# Patient Record
Sex: FEMALE | URBAN - METROPOLITAN AREA
[De-identification: names, ages, dates, MRNs, and addresses within clinical notes are randomized per-mention and may not be internally consistent; named-entity substitution may affect disease eponyms.]

---

## 2020-01-08 ENCOUNTER — RECORDS - HEALTHEAST (OUTPATIENT)
Dept: LAB | Facility: CLINIC | Age: 50
End: 2020-01-08

## 2020-01-08 LAB
BNP SERPL-MCNC: 25 PG/ML (ref 0–73)
T3FREE SERPL-MCNC: 2.8 PG/ML (ref 1.9–3.9)

## 2020-01-09 LAB
ANA SER QL: 0.5 U
THYROID PEROXIDASE ANTIBODIES - HISTORICAL: <3 IU/ML (ref 0–5.6)

## 2020-01-17 LAB — MAMMOGRAM: NORMAL

## 2020-02-13 ENCOUNTER — TRANSFERRED RECORDS (OUTPATIENT)
Dept: HEALTH INFORMATION MANAGEMENT | Facility: CLINIC | Age: 50
End: 2020-02-13

## 2020-10-16 ENCOUNTER — TELEPHONE (OUTPATIENT)
Dept: OTOLARYNGOLOGY | Facility: CLINIC | Age: 50
End: 2020-10-16

## 2020-10-16 ENCOUNTER — TRANSFERRED RECORDS (OUTPATIENT)
Dept: HEALTH INFORMATION MANAGEMENT | Facility: CLINIC | Age: 50
End: 2020-10-16

## 2020-10-16 NOTE — TELEPHONE ENCOUNTER
[facility] Call Center    Phone Message    May a detailed message be left on voicemail: yes     Reason for Call: Other: Pt wants to see Dr. Javed regarding her parathyroid, referred by Endo - Dr Dennis from [facility], Pt is having records and referral faxed, please review for appt when everything is recieved, Thanks!     Action Taken: Message routed to:  [facility] ENT    Travel Screening: Not Applicable

## 2020-10-21 ENCOUNTER — TRANSFERRED RECORDS (OUTPATIENT)
Dept: HEALTH INFORMATION MANAGEMENT | Facility: CLINIC | Age: 50
End: 2020-10-21

## 2020-11-02 ENCOUNTER — TRANSCRIBE ORDERS (OUTPATIENT)
Dept: OTHER | Age: 50
End: 2020-11-02

## 2020-11-02 DIAGNOSIS — E21.3 HYPERPARATHYROIDISM (H): Primary | ICD-10-CM

## 2020-11-04 ENCOUNTER — DOCUMENTATION ONLY (OUTPATIENT)
Dept: CARE COORDINATION | Facility: CLINIC | Age: 50
End: 2020-11-04

## 2020-11-04 NOTE — TELEPHONE ENCOUNTER
The pt called back, has not heard from anyone about scheduling. The dx hyperparatyroidism is not on the GL dx list. Please review for an appt and contact the pt. Thanks.

## 2020-11-04 NOTE — TELEPHONE ENCOUNTER
FUTURE VISIT INFORMATION      FUTURE VISIT INFORMATION:    Date: 11/16/20    Time: 1 PM    Location: [facility] [place]  REFERRAL INFORMATION:    Referring provider:  Carly WELLS    Referring providers clinic:  [facility]    Reason for visit/diagnosis: Hyperparathyroidism    RECORDS REQUESTED FROM:       Clinic name Comments Records Status Imaging Status   [facility] 10/16/20, 9/23/20 - ENDO TV with RUSSELL Shearer Care Everywhere    [facility] 11/27/17 - ENDO OV with Dr. Iniguez Care Everywhere    [facility] - Imaging 12/7/17 - [facility] Parathyroid Care Everywhere 11/4 Req - In PACs                 * 11/4/20 12:29 PM Faxed req to [facility] for images to be pushed to [place] PACs - Frances  * 11/5/20 3:14 PM Images received from [facility] and attached to patient in PACs. - Frances

## 2020-11-16 ENCOUNTER — VIRTUAL VISIT (OUTPATIENT)
Dept: OTOLARYNGOLOGY | Facility: CLINIC | Age: 50
End: 2020-11-16
Attending: INTERNAL MEDICINE
Payer: COMMERCIAL

## 2020-11-16 ENCOUNTER — PREP FOR PROCEDURE (OUTPATIENT)
Dept: OTOLARYNGOLOGY | Facility: CLINIC | Age: 50
End: 2020-11-16

## 2020-11-16 ENCOUNTER — TELEPHONE (OUTPATIENT)
Dept: SURGERY | Facility: CLINIC | Age: 50
End: 2020-11-16

## 2020-11-16 ENCOUNTER — PRE VISIT (OUTPATIENT)
Dept: OTOLARYNGOLOGY | Facility: CLINIC | Age: 50
End: 2020-11-16

## 2020-11-16 DIAGNOSIS — E21.3 HYPERPARATHYROIDISM (H): Primary | ICD-10-CM

## 2020-11-16 PROCEDURE — 99202 OFFICE O/P NEW SF 15 MIN: CPT | Performed by: SURGERY

## 2020-11-16 RX ORDER — DEXAMETHASONE SODIUM PHOSPHATE 4 MG/ML
10 INJECTION, SOLUTION INTRA-ARTICULAR; INTRALESIONAL; INTRAMUSCULAR; INTRAVENOUS; SOFT TISSUE ONCE
Status: CANCELLED | OUTPATIENT
Start: 2020-11-16 | End: 2020-11-16

## 2020-11-16 NOTE — PATIENT INSTRUCTIONS
1. You were seen in the ENT Clinic today by Dr. Javed.  If you have any questions or concerns after your appointment, please call   -  ENT Clinic: [phone]      Ernestine Ozuna LPN  [facility] Otolaryngology  [phone]

## 2020-11-16 NOTE — TELEPHONE ENCOUNTER
[facility] Call Center    Phone Message    May a detailed message be left on voicemail: yes     Reason for Call: Other: Pt is scheduled today at 1 PM with Dr Javed. Called in to let us know that 9 days ago she tested negative for Covid but last week her boyfriend tested positive. She has no symptoms and has avoided direct contact with him.     Should she come in for appt? No answer at back line. Writer advised her to come in unless she hears otherwise.     Action Taken: Message routed to:  [facility] ENT    Travel Screening: Not Applicable

## 2020-11-16 NOTE — TELEPHONE ENCOUNTER
Per Ernestine Ozuna LPN, informed patient we would like to switch her visit to a video visit. Patient is in agreement with this plan. Sent 7fgame sign up via email.    Stephanie Pereyra, EMT

## 2020-11-16 NOTE — LETTER
11/16/2020       RE: Chirsta Grover  [address] [address]     Dear Colleague,    Thank you for referring your patient, Christa Grover, to the [facility] [place] at [facility]. Please see a copy of my visit note below.    SURGERY CLINIC CONSULTATION    REASON FOR CONSULTATION:  Christa Grover was referred by Dr. Dennis for evaluation and discussion of treatment options for hyperparathyroidism       HISTORY OF PRESENT ILLNESS:  Christa Grover is a 50 year old female who was noted to be hypercalcemic since 2008. She was aware of this and choose not to proceed with surgery. Over the next 12 years her Ca has remained elevated (2017: Ca 11.0, PTH and 24hr UCa >400). In Feb 2020 diagnosed with Breast Ca. Most Recent Labs Oct 2020: Ca 10.8, PTH 95, 24 hr UCa 573. Localizing scan Single phase Sestamibi from 2017 did not localize a parathyroid adenoma.    Regarding sx associated with PHPTH: She denies nephrolithiasis, bone density (from 2017) is WNL. She admits to some increased fatigue. Denies muscle aches, GERD or sleep pattern disturbances. No family h/o PHTPH      REVIEW OF SYSTEMS:  ROS EXAM:  10pt ROS pertinent for that noted in HPI  Patient Active Problem List   Diagnosis     Hyperparathyroidism (H)       No past surgical history on file.    No Known Allergies    Medications reviewed in EMR        No family history on file.       PHYSICAL EXAM:  There were no vitals taken for this visit.    Neck: Soft. No cervical lymphadenopathy. Thyroid is midline and WNL    ASSESSMENT:   1. Hyperparathyroidism (H)        PLAN:     First I would like to get a localizing scan-SPEC-CT. Followed by Neck exploration resection parathyroid adenoma. We discussed the procedure as well as risks including, but not limited to bleeding, infection, injury to RLN(s), permanent hypocalcemia or missed parathyroid adenoma.   At the conclusion of our  visit, she felt as if all of her questions and concerns were addressed.    Carly Javed MD

## 2020-11-16 NOTE — PROGRESS NOTES
SURGERY CLINIC CONSULTATION    REASON FOR CONSULTATION:  Christa Grover was referred by Dr. Dennis for evaluation and discussion of treatment options for hyperparathyroidism       HISTORY OF PRESENT ILLNESS:  Christa Grover is a 50 year old female who was noted to be hypercalcemic since 2008. She was aware of this and choose not to proceed with surgery. Over the next 12 years her Ca has remained elevated (2017: Ca 11.0, PTH and 24hr UCa >400). In Feb 2020 diagnosed with Breast Ca. Most Recent Labs Oct 2020: Ca 10.8, PTH 95, 24 hr UCa 573. Localizing scan Single phase Sestamibi from 2017 did not localize a parathyroid adenoma.    Regarding sx associated with PHPTH: She denies nephrolithiasis, bone density (from 2017) is WNL. She admits to some increased fatigue. Denies muscle aches, GERD or sleep pattern disturbances. No family h/o PHTPH      REVIEW OF SYSTEMS:  ROS EXAM:  10pt ROS pertinent for that noted in HPI  Patient Active Problem List   Diagnosis     Hyperparathyroidism (H)       No past surgical history on file.    No Known Allergies    Medications reviewed in EMR        No family history on file.       PHYSICAL EXAM:  There were no vitals taken for this visit.    Neck: Soft. No cervical lymphadenopathy. Thyroid is midline and WNL    ASSESSMENT:   1. Hyperparathyroidism (H)        PLAN:     First I would like to get a localizing scan-SPEC-CT. Followed by Neck exploration resection parathyroid adenoma. We discussed the procedure as well as risks including, but not limited to bleeding, infection, injury to RLN(s), permanent hypocalcemia or missed parathyroid adenoma.   At the conclusion of our visit, she felt as if all of her questions and concerns were addressed.    Carly Javed MD

## 2020-11-18 PROBLEM — E21.3 HYPERPARATHYROIDISM (H): Status: ACTIVE | Noted: 2020-11-18

## 2020-11-20 DIAGNOSIS — Z11.59 ENCOUNTER FOR SCREENING FOR OTHER VIRAL DISEASES: Primary | ICD-10-CM

## 2020-11-23 ENCOUNTER — TELEPHONE (OUTPATIENT)
Dept: SURGERY | Facility: CLINIC | Age: 50
End: 2020-11-23

## 2020-11-23 NOTE — TELEPHONE ENCOUNTER
Called patient to schedule surgery with Dr. Javed.   Date of Surgery: 12/8/2020    Location: [facility] OR     PAC or PCP:  PCP     Post op: 2 weeks post op     Imaging: Parathyroid scan scheduled for 12/1     Surgery packet given: Mailed     Sent to Prior Authorization Team: 11/20/2020    Additional comments:  Information given regarding parathyroid scan. Mailed out information for patient to review. Patients covid19 test is scheduled for [facility].  No further questions or concerns at this time. Understands times may change to OR schedule.       Isabelle Pereyra   Perioperative Coordinator   Department of Otolaryngology  P: [phone]

## 2020-11-30 ENCOUNTER — TELEPHONE (OUTPATIENT)
Dept: OTOLARYNGOLOGY | Facility: CLINIC | Age: 50
End: 2020-11-30

## 2020-11-30 NOTE — TELEPHONE ENCOUNTER
Patient called writer in regards to getting times for surgery. Writer stated that surgical nurse will call her with times a couple days before surgery. Writer also stated that this is due to schedules changing constantly in OR. She was agreeable.       Ernestine Ozuna LPN

## 2020-12-01 ENCOUNTER — HOSPITAL ENCOUNTER (OUTPATIENT)
Dept: NUCLEAR MEDICINE | Facility: CLINIC | Age: 50
Setting detail: NUCLEAR MEDICINE
End: 2020-12-01
Attending: SURGERY
Payer: COMMERCIAL

## 2020-12-01 DIAGNOSIS — E21.3 HYPERPARATHYROIDISM (H): ICD-10-CM

## 2020-12-01 PROCEDURE — 78072 PARATHYRD PLANAR W/SPECT&CT: CPT | Mod: 26 | Performed by: RADIOLOGY

## 2020-12-01 PROCEDURE — A9516 IODINE I-123 SOD IODIDE MIC: HCPCS | Performed by: SURGERY

## 2020-12-01 PROCEDURE — 343N000001 HC RX 343: Performed by: SURGERY

## 2020-12-01 PROCEDURE — A9500 TC99M SESTAMIBI: HCPCS | Performed by: SURGERY

## 2020-12-01 PROCEDURE — 78072 PARATHYRD PLANAR W/SPECT&CT: CPT

## 2020-12-01 RX ADMIN — KIT FOR THE PREPARATION OF TECHNETIUM TC99M SESTAMIBI 26.3 MILLICURIE: 1 INJECTION, POWDER, LYOPHILIZED, FOR SOLUTION PARENTERAL at 11:53

## 2020-12-01 RX ADMIN — Medication 820 UCI.: at 09:36

## 2020-12-03 ENCOUNTER — MYC MEDICAL ADVICE (OUTPATIENT)
Dept: OTOLARYNGOLOGY | Facility: CLINIC | Age: 50
End: 2020-12-03

## 2020-12-04 DIAGNOSIS — Z11.59 ENCOUNTER FOR SCREENING FOR OTHER VIRAL DISEASES: ICD-10-CM

## 2020-12-04 PROCEDURE — U0003 INFECTIOUS AGENT DETECTION BY NUCLEIC ACID (DNA OR RNA); SEVERE ACUTE RESPIRATORY SYNDROME CORONAVIRUS 2 (SARS-COV-2) (CORONAVIRUS DISEASE [COVID-19]), AMPLIFIED PROBE TECHNIQUE, MAKING USE OF HIGH THROUGHPUT TECHNOLOGIES AS DESCRIBED BY CMS-2020-01-R: HCPCS | Performed by: SURGERY

## 2020-12-05 LAB
SARS-COV-2 RNA SPEC QL NAA+PROBE: NOT DETECTED
SPECIMEN SOURCE: NORMAL

## 2020-12-07 ENCOUNTER — ANESTHESIA EVENT (OUTPATIENT)
Dept: SURGERY | Facility: AMBULATORY SURGERY CENTER | Age: 50
End: 2020-12-07

## 2020-12-07 NOTE — ANESTHESIA PREPROCEDURE EVALUATION
Anesthesia Pre-Procedure Evaluation    Patient: Christa Grover   MRN:     [id number] Gender:   female   Age:    50 year old :      1970        Preoperative Diagnosis: Hyperparathyroidism (H) [E21.3]   Procedure(s):  PARATHYROIDECTOMY     LABS:  CBC: No results found for: WBC, HGB, HCT, PLT  BMP: No results found for: NA, POTASSIUM, CHLORIDE, CO2, BUN, CR, GLC  COAGS: No results found for: PTT, INR, FIBR  POC: No results found for: BGM, HCG, HCGS  OTHER: No results found for: PH, LACT, A1C, YUNIER, PHOS, MAG, ALBUMIN, PROTTOTAL, ALT, AST, GGT, ALKPHOS, BILITOTAL, BILIDIRECT, LIPASE, AMYLASE, PRAVEEN, TSH, T4, T3, CRP, SED     Preop Vitals    BP Readings from Last 3 Encounters:   No data found for BP    Pulse Readings from Last 3 Encounters:   No data found for Pulse      Resp Readings from Last 3 Encounters:   No data found for Resp    SpO2 Readings from Last 3 Encounters:   No data found for SpO2      Temp Readings from Last 1 Encounters:   No data found for Temp    Ht Readings from Last 1 Encounters:   No data found for Ht      Wt Readings from Last 1 Encounters:   No data found for Wt    There is no height or weight on file to calculate BMI.     LDA:        No past medical history on file.   No past surgical history on file.   No Known Allergies     Anesthesia Evaluation     .             ROS/MED HX    ENT/Pulmonary: Comment: TMJ - neg pulmonary ROS     Neurologic:  - neg neurologic ROS     Cardiovascular:  - neg cardiovascular ROS       METS/Exercise Tolerance:     Hematologic:  - neg hematologic  ROS       Musculoskeletal:  - neg musculoskeletal ROS       GI/Hepatic:  - neg GI/hepatic ROS       Renal/Genitourinary:  - ROS Renal section negative       Endo:  - neg endo ROS       Psychiatric:  - neg psychiatric ROS       Infectious Disease:  - neg infectious disease ROS       Malignancy:   (+) Malignancy History of Breast  Breast CA status post Surgery.      - no malignancy   Other:    - neg other ROS                      PHYSICAL EXAM:   Mental Status/Neuro: A/A/O   Airway: Facies: Feasible  Mallampati: I  Mouth/Opening: Full  TM distance: > 6 cm  Neck ROM: Full   Respiratory: Auscultation: CTAB     Resp. Rate: Normal     Resp. Effort: Normal      CV: Rhythm: Regular  Rate: Age appropriate  Heart: Normal Sounds  Edema: None   Comments:      Dental: Normal Dentition                Assessment:   ASA SCORE: 1    H&P: History and physical reviewed and following examination; no interval change.         Plan:   Anes. Type:  General   Pre-Medication: None   Induction:  IV (Standard)   Airway: LMA   Access/Monitoring: PIV   Maintenance: Balanced     Postop Plan:   Postop Pain: Opioids  Postop Sedation/Airway: Not planned  Disposition: Outpatient     PONV Management:   Adult Risk Factors: Female, Postop Opioids   Prevention: Ondansetron, Dexamethasone     CONSENT: Direct conversation   Plan and risks discussed with: Patient   Blood Products: Consent Deferred (Minimal Blood Loss)                   Mehul Crowe MD

## 2020-12-08 ENCOUNTER — HOSPITAL ENCOUNTER (OUTPATIENT)
Facility: AMBULATORY SURGERY CENTER | Age: 50
Discharge: HOME | End: 2020-12-08
Attending: SURGERY | Admitting: SURGERY
Payer: COMMERCIAL

## 2020-12-08 ENCOUNTER — ANESTHESIA (OUTPATIENT)
Dept: SURGERY | Facility: AMBULATORY SURGERY CENTER | Age: 50
End: 2020-12-08

## 2020-12-08 VITALS
RESPIRATION RATE: 14 BRPM | HEART RATE: 90 BPM | SYSTOLIC BLOOD PRESSURE: 129 MMHG | WEIGHT: 160 LBS | OXYGEN SATURATION: 99 % | TEMPERATURE: 97.3 F | DIASTOLIC BLOOD PRESSURE: 83 MMHG | HEIGHT: 66 IN | BODY MASS INDEX: 25.71 KG/M2

## 2020-12-08 DIAGNOSIS — E21.3 HYPERPARATHYROIDISM (H): ICD-10-CM

## 2020-12-08 LAB
HCG UR QL: NEGATIVE
INTERNAL QC OK POCT: YES
PTH-INTACT SERPL-MCNC: 10 PG/ML (ref 18–80)
PTH-INTACT SERPL-MCNC: 25 PG/ML (ref 18–80)
PTH-INTACT SERPL-MCNC: 96 PG/ML (ref 18–80)

## 2020-12-08 PROCEDURE — 36415 COLL VENOUS BLD VENIPUNCTURE: CPT | Performed by: PATHOLOGY

## 2020-12-08 PROCEDURE — 60500 EXPLORE PARATHYROID GLANDS: CPT | Mod: GC

## 2020-12-08 PROCEDURE — 88331 PATH CONSLTJ SURG 1 BLK 1SPC: CPT | Performed by: PATHOLOGY

## 2020-12-08 PROCEDURE — 99000 SPECIMEN HANDLING OFFICE-LAB: CPT | Performed by: PATHOLOGY

## 2020-12-08 PROCEDURE — 83970 ASSAY OF PARATHORMONE: CPT | Mod: 90 | Performed by: PATHOLOGY

## 2020-12-08 PROCEDURE — 88305 TISSUE EXAM BY PATHOLOGIST: CPT | Performed by: PATHOLOGY

## 2020-12-08 RX ORDER — NALOXONE HYDROCHLORIDE 0.4 MG/ML
0.2 INJECTION, SOLUTION INTRAMUSCULAR; INTRAVENOUS; SUBCUTANEOUS
Status: DISCONTINUED | OUTPATIENT
Start: 2020-12-08 | End: 2020-12-09 | Stop reason: HOSPADM

## 2020-12-08 RX ORDER — ALBUTEROL SULFATE 0.83 MG/ML
2.5 SOLUTION RESPIRATORY (INHALATION) EVERY 4 HOURS PRN
Status: DISCONTINUED | OUTPATIENT
Start: 2020-12-08 | End: 2020-12-08 | Stop reason: HOSPADM

## 2020-12-08 RX ORDER — PROPOFOL 10 MG/ML
INJECTION, EMULSION INTRAVENOUS PRN
Status: DISCONTINUED | OUTPATIENT
Start: 2020-12-08 | End: 2020-12-08

## 2020-12-08 RX ORDER — DEXAMETHASONE SODIUM PHOSPHATE 10 MG/ML
10 INJECTION, SOLUTION INTRAMUSCULAR; INTRAVENOUS ONCE
Status: COMPLETED | OUTPATIENT
Start: 2020-12-08 | End: 2020-12-08

## 2020-12-08 RX ORDER — FEXOFENADINE HCL 180 MG/1
180 TABLET ORAL DAILY
COMMUNITY

## 2020-12-08 RX ORDER — ONDANSETRON 4 MG/1
4 TABLET, ORALLY DISINTEGRATING ORAL EVERY 30 MIN PRN
Status: DISCONTINUED | OUTPATIENT
Start: 2020-12-08 | End: 2020-12-09 | Stop reason: HOSPADM

## 2020-12-08 RX ORDER — SODIUM CHLORIDE, SODIUM LACTATE, POTASSIUM CHLORIDE, CALCIUM CHLORIDE 600; 310; 30; 20 MG/100ML; MG/100ML; MG/100ML; MG/100ML
INJECTION, SOLUTION INTRAVENOUS CONTINUOUS
Status: DISCONTINUED | OUTPATIENT
Start: 2020-12-08 | End: 2020-12-09 | Stop reason: HOSPADM

## 2020-12-08 RX ORDER — ONDANSETRON 2 MG/ML
4 INJECTION INTRAMUSCULAR; INTRAVENOUS EVERY 30 MIN PRN
Status: DISCONTINUED | OUTPATIENT
Start: 2020-12-08 | End: 2020-12-09 | Stop reason: HOSPADM

## 2020-12-08 RX ORDER — LIDOCAINE 40 MG/G
CREAM TOPICAL
Status: DISCONTINUED | OUTPATIENT
Start: 2020-12-08 | End: 2020-12-08 | Stop reason: HOSPADM

## 2020-12-08 RX ORDER — DEXAMETHASONE SODIUM PHOSPHATE 10 MG/ML
4 INJECTION, SOLUTION INTRAMUSCULAR; INTRAVENOUS EVERY 10 MIN PRN
Status: DISCONTINUED | OUTPATIENT
Start: 2020-12-08 | End: 2020-12-09 | Stop reason: HOSPADM

## 2020-12-08 RX ORDER — OXYCODONE HYDROCHLORIDE 5 MG/1
5-10 TABLET ORAL EVERY 4 HOURS PRN
Status: DISCONTINUED | OUTPATIENT
Start: 2020-12-08 | End: 2020-12-09 | Stop reason: HOSPADM

## 2020-12-08 RX ORDER — LABETALOL 20 MG/4 ML (5 MG/ML) INTRAVENOUS SYRINGE
10
Status: DISCONTINUED | OUTPATIENT
Start: 2020-12-08 | End: 2020-12-08 | Stop reason: HOSPADM

## 2020-12-08 RX ORDER — FENTANYL CITRATE 50 UG/ML
25-50 INJECTION, SOLUTION INTRAMUSCULAR; INTRAVENOUS
Status: DISCONTINUED | OUTPATIENT
Start: 2020-12-08 | End: 2020-12-08 | Stop reason: HOSPADM

## 2020-12-08 RX ORDER — ACETAMINOPHEN 325 MG/1
650 TABLET ORAL EVERY 6 HOURS
Qty: 50 TABLET | Refills: 0 | Status: SHIPPED | OUTPATIENT
Start: 2020-12-08

## 2020-12-08 RX ORDER — GABAPENTIN 300 MG/1
300 CAPSULE ORAL ONCE
Status: COMPLETED | OUTPATIENT
Start: 2020-12-08 | End: 2020-12-08

## 2020-12-08 RX ORDER — FENTANYL CITRATE 50 UG/ML
25-50 INJECTION, SOLUTION INTRAMUSCULAR; INTRAVENOUS
Status: DISCONTINUED | OUTPATIENT
Start: 2020-12-08 | End: 2020-12-09 | Stop reason: HOSPADM

## 2020-12-08 RX ORDER — GLYCOPYRROLATE 0.2 MG/ML
INJECTION, SOLUTION INTRAMUSCULAR; INTRAVENOUS PRN
Status: DISCONTINUED | OUTPATIENT
Start: 2020-12-08 | End: 2020-12-08

## 2020-12-08 RX ORDER — FENTANYL CITRATE 50 UG/ML
INJECTION, SOLUTION INTRAMUSCULAR; INTRAVENOUS PRN
Status: DISCONTINUED | OUTPATIENT
Start: 2020-12-08 | End: 2020-12-08

## 2020-12-08 RX ORDER — SODIUM CHLORIDE, SODIUM LACTATE, POTASSIUM CHLORIDE, CALCIUM CHLORIDE 600; 310; 30; 20 MG/100ML; MG/100ML; MG/100ML; MG/100ML
INJECTION, SOLUTION INTRAVENOUS CONTINUOUS
Status: DISCONTINUED | OUTPATIENT
Start: 2020-12-08 | End: 2020-12-08 | Stop reason: HOSPADM

## 2020-12-08 RX ORDER — POLYETHYLENE GLYCOL 3350 17 G/17G
1 POWDER, FOR SOLUTION ORAL DAILY
Qty: 20 PACKET | Refills: 0 | Status: SHIPPED | OUTPATIENT
Start: 2020-12-08

## 2020-12-08 RX ORDER — ACETAMINOPHEN 325 MG/1
975 TABLET ORAL ONCE
Status: COMPLETED | OUTPATIENT
Start: 2020-12-08 | End: 2020-12-08

## 2020-12-08 RX ORDER — ONDANSETRON 2 MG/ML
INJECTION INTRAMUSCULAR; INTRAVENOUS PRN
Status: DISCONTINUED | OUTPATIENT
Start: 2020-12-08 | End: 2020-12-08

## 2020-12-08 RX ORDER — NALOXONE HYDROCHLORIDE 0.4 MG/ML
0.4 INJECTION, SOLUTION INTRAMUSCULAR; INTRAVENOUS; SUBCUTANEOUS
Status: DISCONTINUED | OUTPATIENT
Start: 2020-12-08 | End: 2020-12-09 | Stop reason: HOSPADM

## 2020-12-08 RX ORDER — KETOROLAC TROMETHAMINE 30 MG/ML
30 INJECTION, SOLUTION INTRAMUSCULAR; INTRAVENOUS EVERY 6 HOURS PRN
Status: DISCONTINUED | OUTPATIENT
Start: 2020-12-08 | End: 2020-12-09 | Stop reason: HOSPADM

## 2020-12-08 RX ORDER — TAMOXIFEN CITRATE 20 MG/1
TABLET ORAL DAILY
COMMUNITY

## 2020-12-08 RX ORDER — MEPERIDINE HYDROCHLORIDE 25 MG/ML
12.5 INJECTION INTRAMUSCULAR; INTRAVENOUS; SUBCUTANEOUS
Status: DISCONTINUED | OUTPATIENT
Start: 2020-12-08 | End: 2020-12-09 | Stop reason: HOSPADM

## 2020-12-08 RX ORDER — PROPOFOL 10 MG/ML
INJECTION, EMULSION INTRAVENOUS CONTINUOUS PRN
Status: DISCONTINUED | OUTPATIENT
Start: 2020-12-08 | End: 2020-12-08

## 2020-12-08 RX ORDER — OXYCODONE HYDROCHLORIDE 5 MG/1
5 TABLET ORAL EVERY 6 HOURS PRN
Qty: 12 TABLET | Refills: 0 | Status: SHIPPED | OUTPATIENT
Start: 2020-12-08

## 2020-12-08 RX ORDER — LIDOCAINE HYDROCHLORIDE 20 MG/ML
INJECTION, SOLUTION INFILTRATION; PERINEURAL PRN
Status: DISCONTINUED | OUTPATIENT
Start: 2020-12-08 | End: 2020-12-08

## 2020-12-08 RX ORDER — EPHEDRINE SULFATE 50 MG/ML
INJECTION, SOLUTION INTRAMUSCULAR; INTRAVENOUS; SUBCUTANEOUS PRN
Status: DISCONTINUED | OUTPATIENT
Start: 2020-12-08 | End: 2020-12-08

## 2020-12-08 RX ORDER — HYDROMORPHONE HYDROCHLORIDE 1 MG/ML
.3-.5 INJECTION, SOLUTION INTRAMUSCULAR; INTRAVENOUS; SUBCUTANEOUS EVERY 10 MIN PRN
Status: DISCONTINUED | OUTPATIENT
Start: 2020-12-08 | End: 2020-12-09 | Stop reason: HOSPADM

## 2020-12-08 RX ADMIN — DEXAMETHASONE SODIUM PHOSPHATE 10 MG: 10 INJECTION, SOLUTION INTRAMUSCULAR; INTRAVENOUS at 09:40

## 2020-12-08 RX ADMIN — FENTANYL CITRATE 50 MCG: 50 INJECTION, SOLUTION INTRAMUSCULAR; INTRAVENOUS at 09:38

## 2020-12-08 RX ADMIN — FENTANYL CITRATE 50 MCG: 50 INJECTION, SOLUTION INTRAMUSCULAR; INTRAVENOUS at 09:33

## 2020-12-08 RX ADMIN — GABAPENTIN 300 MG: 300 CAPSULE ORAL at 08:05

## 2020-12-08 RX ADMIN — Medication 100 MCG: at 09:50

## 2020-12-08 RX ADMIN — FENTANYL CITRATE 25 MCG: 50 INJECTION, SOLUTION INTRAMUSCULAR; INTRAVENOUS at 10:55

## 2020-12-08 RX ADMIN — ONDANSETRON 4 MG: 2 INJECTION INTRAMUSCULAR; INTRAVENOUS at 11:44

## 2020-12-08 RX ADMIN — ACETAMINOPHEN 975 MG: 325 TABLET ORAL at 08:05

## 2020-12-08 RX ADMIN — EPHEDRINE SULFATE 10 MG: 50 INJECTION, SOLUTION INTRAMUSCULAR; INTRAVENOUS; SUBCUTANEOUS at 09:49

## 2020-12-08 RX ADMIN — OXYCODONE HYDROCHLORIDE 5 MG: 5 TABLET ORAL at 10:56

## 2020-12-08 RX ADMIN — PROPOFOL 200 MG: 10 INJECTION, EMULSION INTRAVENOUS at 09:38

## 2020-12-08 RX ADMIN — Medication 50 MCG: at 10:05

## 2020-12-08 RX ADMIN — ONDANSETRON 4 MG: 2 INJECTION INTRAMUSCULAR; INTRAVENOUS at 09:33

## 2020-12-08 RX ADMIN — PROPOFOL 100 MCG/KG/MIN: 10 INJECTION, EMULSION INTRAVENOUS at 09:39

## 2020-12-08 RX ADMIN — GLYCOPYRROLATE 0.2 MG: 0.2 INJECTION, SOLUTION INTRAMUSCULAR; INTRAVENOUS at 09:33

## 2020-12-08 RX ADMIN — SODIUM CHLORIDE, SODIUM LACTATE, POTASSIUM CHLORIDE, CALCIUM CHLORIDE: 600; 310; 30; 20 INJECTION, SOLUTION INTRAVENOUS at 08:10

## 2020-12-08 RX ADMIN — LIDOCAINE HYDROCHLORIDE 80 MG: 20 INJECTION, SOLUTION INFILTRATION; PERINEURAL at 09:37

## 2020-12-08 RX ADMIN — Medication 50 MCG: at 10:04

## 2020-12-08 ASSESSMENT — MIFFLIN-ST. JEOR: SCORE: 1357.51

## 2020-12-08 NOTE — BRIEF OP NOTE
[facility]    Brief Operative Note    Pre-operative diagnosis: Hyperparathyroidism (H) [E21.3]  Post-operative diagnosis Same as pre-operative diagnosis    Procedure: Procedure(s):  NECK EXPLORATION, RIGHT INFERIOR PARATHYROIDECTOMY  Surgeon: Surgeon(s) and Role:     * Carly Javed MD - Primary  Anesthesia: General   Estimated blood loss: 3 cc  Drains: None  Specimens:   ID Type Source Tests Collected by Time Destination   A : Right inferior parathyroid Tissue Parathyroid SURGICAL PATHOLOGY EXAM Carly Javed MD 12/8/2020 10:08 AM      Findings:   Right inferior parathyroid adenoma.  Complications: None.  Implants: * No implants in log *

## 2020-12-08 NOTE — DISCHARGE INSTRUCTIONS
"[facility]  Home Care Following Anesthesia  For 24 hours after surgery:  1. Get plenty of rest.  A responsible adult must stay with you for at least 24 hours after you leave the surgery center.  2. Do not drive or use heavy equipment.  If you have weakness or tingling, don't drive or use heavy equipment until this feeling goes away.   3. Do not drink alcohol.   4. Avoid strenuous or risky activities.  Ask for help when climbing stairs.  5. You may feel lightheaded.  IF so, sit for a few minutes before standing.  Have someone help you get up.   6. If you have nausea (feel sick to your stomach): Drink only clear liquids such as apple juice, ginger ale, broth or 7-Up.  Rest may also help.  Be sure to drink enough fluids.  Move to a regular diet as you feel able.   7. You may have a slight fever.  Call the doctor if your fever is over 100 F (37.7 C) (taken under the tongue) or lasts longer than 24 hours.  8. You may have a dry mouth, a sore throat, muscle aches or trouble sleeping. These should go away after 24 hours.  9. Do not make important or legal decisions.        Today you received a Marcaine or bupivacaine block to numb the nerves near your surgery site.  This is a block using local anesthetic or \"numbing\" medication injected around the nerves to anesthetize or \"numb\" the area supplied by those nerves.  This block is injected into the muscle layer near your surgical site.  The medication may numb the location where you had surgery for 6-18 hours, but may last up to 24 hours.  If your surgical site is an arm or leg you should be careful with your affected limb, since it is possible to injure your limb without being aware of it due to the numbing.  Until full feeling returns, you should guard against bumping or hitting your limb, and avoid extreme hot or cold temperatures on the skin.  As the block wears off, the feeling will return as a tingling or prickly sensation near your " surgical site.  You will experience more discomfort from your incision as the feeling returns.  You may want to take a pain pill (a narcotic or Tylenol if this was prescribed by your surgeon) when you start to experience mild pain before the pain beccomes more severe.  If your pain medications do not control your pain you should notifiy your surgeon.    Tips for taking pain medications  To get the best pain relief possible, remember these points:    Take pain medications as directed, before pain becomes severe.    Pain medication can upset your stomach: taking it with food may help.    Constipation is a common side effect of pain medication. Drink plenty of  fluids.    Eat foods high in fiber. Take a stool softener if recommended by your doctor or pharmacist.    Do not drink alcohol, drive or operate machinery while taking pain medications.    Ask about other ways to control pain, such as with heat, ice or relaxation.    Tylenol/Acetaminophen Consumption  To help encourage the safe use of acetaminophen, the makers of TYLENOL  have lowered the maximum daily dose for single-ingredient Extra Strength TYLENOL  (acetaminophen) products sold in the [place] from 8 pills per day (4,000 mg) to 6 pills per day (3,000 mg). The dosing interval has also changed from 2 pills every 4-6 hours to 2 pills every 6 hours.    If you feel your pain relief is insufficient, you may take Tylenol/Acetaminophen in addition to your narcotic pain medication.     Be careful not to exceed 3,000 mg of Tylenol/Acetaminophen in a 24 hour period from all sources.    If you are taking extra strength Tylenol/acetaminophen (500 mg), the maximum dose is 6 tablets in 24 hours.    If you are taking regular strength acetaminophen (325 mg), the maximum dose is 9 tablets in 24 hours.    Call a doctor for any of the followin. Signs of infection (fever, growing tenderness at the surgery site, a large amount of drainage or bleeding, severe pain, foul-smelling  drainage, redness, swelling).  2. It has been over 8 to 10 hours since surgery and you are still not able to urinate (pass water).  3. Headache for over 24 hours.  4. Numbness, tingling or weakness the day after surgery (if you had spinal anesthesia).  5. Signs of Covid-19 infection (temperature over 100 degrees, shortness of breath, cough, loss of taste/smell, generalized body aches, persistent headache, chills, sore throat, nausea/vomiting/diarrhea)  Your doctor is:  Dr. Carly Javed, ENT Otolaryngology: [phone]  Or dial [phone] and ask for the resident on call for:  ENT Otolaryngology  For emergency care, call the:  [place] [facility]:  [phone] (TTY for hearing impaired: [phone])

## 2020-12-08 NOTE — ANESTHESIA CARE TRANSFER NOTE
Patient: Christa Grover    Procedure(s):  NECK EXPLORATION, RIGHT INFERIOR PARATHYROIDECTOMY    Diagnosis: Hyperparathyroidism (H) [E21.3]  Diagnosis Additional Information: No value filed.    Anesthesia Type:   General     Note:  Airway :Room Air  Patient transferred to:PACU  Comments: Uneventful transport to PACU; VSS; Report given to RN; Pt comfortable; IV patent: pt exchanging wellHandoff Report: Identifed the Patient, Identified the Reponsible Provider, Reviewed the pertinent medical history, Discussed the surgical course, Reviewed Intra-OP anesthesia mangement and issues during anesthesia, Set expectations for post-procedure period and Allowed opportunity for questions and acknowledgement of understanding      Vitals: (Last set prior to Anesthesia Care Transfer)    CRNA VITALS  12/8/2020 1012 - 12/8/2020 1042      12/8/2020             Pulse:  97    SpO2:  98 %    Resp Rate (observed):  (!) 3                Electronically Signed By: WOODY Fleming CRNA  December 8, 2020  10:42 AM

## 2020-12-09 LAB — COPATH REPORT: NORMAL

## 2020-12-15 NOTE — OP NOTE
Procedure Date: 12/08/2020      PREOPERATIVE DIAGNOSIS:  Primary hyperparathyroidism.      POSTOPERATIVE DIAGNOSIS:  Primary hyperparathyroidism.      SURGICAL PROCEDURE PERFORMED:  Neck exploration, resection of right inferior parathyroid adenoma with 30 minutes of intraoperative recurrent laryngeal nerve monitoring and intraoperative parathyroid hormone testing.      SURGEON:  Carly Javed MD      ASSISTANT:  Samuel Nieves MD      ANESTHESIA:  General endotracheal with nerve monitoring endotracheal tube.      COMPLICATIONS:  None.      ESTIMATED BLOOD LOSS:  Less than 5 mL.      Preincision parathyroid hormone level 96.  A 20 minute post excision parathyroid hormone level 10.      CLINICAL INDICATIONS FOR THE PROCEDURE:  This is a 50-year-old female noted to be hypercalcemic.  A workup ensued that confirmed primary hyperparathyroidism.  A localizing study localized this to the right side, likely inferior position.  Based on this, it was recommended the patient undergo a neck exploration, resection of parathyroid adenoma or adenomas.  Surgical procedure was discussed with the patient, including, but not limited to, the risks of bleeding, infection, injury to the recurrent laryngeal nerve or nerves, potential permanent hypocalcemia, loss of airway.  The patient was aware of this and agreed to proceed with surgery.  Consent was obtained, and the site was marked.      DETAILS OF PROCEDURE:  The patient was brought to the operating room in stable condition and placed on the operating table in supine position.  After appropriate general anesthesia was obtained, the patient was prepped and draped in a sterile fashion.  A time out was then performed.  A 4 cm incision was made over the anterior neck following a natural skin crease.  The platysma was then divided, and subplatysmal planes were then created.  The strap muscles were divided at the midline and retracted laterally.  Based on the localizing study, we  evaluated the right neck first.  The right lobe of the thyroid gland was retracted medially.  We identified an enlarged right inferior parathyroid gland just inferior to the right lobe of the thyroid.  We followed its blood supply, clipped it and then divided using the bipolar cautery.  At the same time, we also identified the right recurrent laryngeal nerve.  We followed this cephalad and identified a normal-appearing right superior parathyroid gland.  We then inspected the left neck.  The left inferior parathyroid gland appeared to be of normal size and normal position.  The left recurrent laryngeal nerve also was intact and viable.  We now found 3 parathyroid glands, 1 abnormal and 2 normal.  The patient had a single localizing parathyroid adenoma and an appropriate drop in her post excision PTH.  Therefore, we inspected the area again for bleeding, and none was noted.  Fibrillar was then placed in the operative bed.  The strap muscles were then approximated at the midline using a 3-0 chromic interrupted suture.  The platysma was approximated using a 3-0 chromic interrupted suture.  The skin incision was approximated with a 5-0 Monocryl running subcuticular suture.  The wound was dressed with Dermabond.  The patient was then extubated and returned to the recovery room in stable condition.  I was present for the entire surgical procedure.         DEBBI LOPEZ MD             D: 12/15/2020   T: 12/15/2020   MT: petr      Name:     KHOA GARVIN   MRN:      [id number]        Account:        [id number]   :      1970           Procedure Date: 2020      Document: [id number]

## 2020-12-17 ENCOUNTER — TELEPHONE (OUTPATIENT)
Dept: OTOLARYNGOLOGY | Facility: CLINIC | Age: 50
End: 2020-12-17

## 2020-12-17 NOTE — TELEPHONE ENCOUNTER
Writer called patient to inform her that Dr. Javed will not be coming into clinic on 12/21 so we will need to change her appointment to either a video or telephone visit. Patient did not answer, writer will call back later.      Mary Morin, EMT

## 2020-12-17 NOTE — TELEPHONE ENCOUNTER
Writer talked to patient and patient agreed to change her visit on 12/21 with Dr. Javed to a video visit. Patient expressed understanding.      Mary Morin, EMT

## 2020-12-21 ENCOUNTER — VIRTUAL VISIT (OUTPATIENT)
Dept: OTOLARYNGOLOGY | Facility: CLINIC | Age: 50
End: 2020-12-21
Payer: COMMERCIAL

## 2020-12-21 VITALS — BODY MASS INDEX: 25.71 KG/M2 | HEIGHT: 66 IN | WEIGHT: 160 LBS

## 2020-12-21 DIAGNOSIS — Z90.89 S/P PARATHYROIDECTOMY: Primary | ICD-10-CM

## 2020-12-21 DIAGNOSIS — Z98.890 S/P PARATHYROIDECTOMY: Primary | ICD-10-CM

## 2020-12-21 PROCEDURE — 99024 POSTOP FOLLOW-UP VISIT: CPT | Performed by: SURGERY

## 2020-12-21 ASSESSMENT — PAIN SCALES - GENERAL: PAINLEVEL: NO PAIN (0)

## 2020-12-21 ASSESSMENT — MIFFLIN-ST. JEOR: SCORE: 1349.57

## 2020-12-21 NOTE — PROGRESS NOTES
"Christa Grover is a 50 year old female who is being evaluated via a billable video visit.      The patient has been notified of following:     \"This video visit will be conducted via a call between you and your physician/provider. We have found that certain health care needs can be provided without the need for an in-person physical exam.  This service lets us provide the care you need with a video conversation.  If a prescription is necessary we can send it directly to your pharmacy.  If lab work is needed we can place an order for that and you can then stop by our lab to have the test done at a later time.    Video visits are billed at different rates depending on your insurance coverage.  Please reach out to your insurance provider with any questions.    If during the course of the call the physician/provider feels a video visit is not appropriate, you will not be charged for this service.\"    Patient has given verbal consent for Video visit? Yes  How would you like to obtain your AVS? MyChart  If you are dropped from the video visit, the video invite should be resent to: Text to cell phone: on chart  Will anyone else be joining your video visit? No        Video-Visit Details    Type of service:  Video Visit    Video Start Time: 3:45pm  Video End Time: 4:00pm    Originating Location (pt. Location): Home    Distant Location (provider location):  [facility] [place]     Platform used for Video Visit: John Javed MD      2 week post op visit s/p resection right inferior parathyroid adenoma. PTH went from 96 pre-incision to 10  At twenty minutes post excision.     Since the surgery, the patient denies any problems with voice quality, inspiration or swallowing. She has not sx of hypocalcemia    Via video: the wound appears to be healing well. The dermabond has come off. No evidence of hematoma, seroma or infection.    patholoty and PTH levels discussed with the " patient    Plan:  Reviewed wound management and massage  Check PTH, Ca, and Vit D  Follow up with PCP    Carly Javed MD

## 2020-12-21 NOTE — LETTER
"12/21/2020       RE: Christa Grover  [address] [facility] [address]     Dear Colleague,    Thank you for referring your patient, Christa Grover, to the [facility] [place] at [facility]. Please see a copy of my visit note below.    Christa Grover is a 50 year old female who is being evaluated via a billable video visit.      The patient has been notified of following:     \"This video visit will be conducted via a call between you and your physician/provider. We have found that certain health care needs can be provided without the need for an in-person physical exam.  This service lets us provide the care you need with a video conversation.  If a prescription is necessary we can send it directly to your pharmacy.  If lab work is needed we can place an order for that and you can then stop by our lab to have the test done at a later time.    Video visits are billed at different rates depending on your insurance coverage.  Please reach out to your insurance provider with any questions.    If during the course of the call the physician/provider feels a video visit is not appropriate, you will not be charged for this service.\"    Patient has given verbal consent for Video visit? Yes  How would you like to obtain your AVS? MyChart  If you are dropped from the video visit, the video invite should be resent to: Text to cell phone: on chart  Will anyone else be joining your video visit? No        Video-Visit Details    Type of service:  Video Visit    Video Start Time: 3:45pm  Video End Time: 4:00pm    Originating Location (pt. Location): Home    Distant Location (provider location):  [facility]     Platform used for Video Visit: John Javed MD      2 week post op visit s/p resection right inferior parathyroid adenoma. PTH went from 96 pre-incision to 10  At twenty minutes post " excision.     Since the surgery, the patient denies any problems with voice quality, inspiration or swallowing. She has not sx of hypocalcemia    Via video: the wound appears to be healing well. The dermabond has come off. No evidence of hematoma, seroma or infection.    patholoty and PTH levels discussed with the patient    Plan:  Reviewed wound management and massage  Check PTH, Ca, and Vit D  Follow up with PCP    Carly Javed MD

## 2020-12-22 ENCOUNTER — TRANSFERRED RECORDS (OUTPATIENT)
Dept: HEALTH INFORMATION MANAGEMENT | Facility: CLINIC | Age: 50
End: 2020-12-22

## 2021-01-09 ENCOUNTER — HEALTH MAINTENANCE LETTER (OUTPATIENT)
Age: 51
End: 2021-01-09

## 2021-04-22 ENCOUNTER — TRANSFERRED RECORDS (OUTPATIENT)
Dept: HEALTH INFORMATION MANAGEMENT | Facility: CLINIC | Age: 51
End: 2021-04-22

## 2021-05-01 ENCOUNTER — HEALTH MAINTENANCE LETTER (OUTPATIENT)
Age: 51
End: 2021-05-01

## 2021-07-15 ENCOUNTER — TRANSFERRED RECORDS (OUTPATIENT)
Dept: HEALTH INFORMATION MANAGEMENT | Facility: CLINIC | Age: 51
End: 2021-07-15

## 2021-10-11 ENCOUNTER — HEALTH MAINTENANCE LETTER (OUTPATIENT)
Age: 51
End: 2021-10-11

## 2021-12-09 ENCOUNTER — TRANSFERRED RECORDS (OUTPATIENT)
Dept: HEALTH INFORMATION MANAGEMENT | Facility: CLINIC | Age: 51
End: 2021-12-09
Payer: COMMERCIAL

## 2022-01-30 ENCOUNTER — HEALTH MAINTENANCE LETTER (OUTPATIENT)
Age: 52
End: 2022-01-30

## 2022-05-07 NOTE — ANESTHESIA POSTPROCEDURE EVALUATION
Anesthesia POST Procedure Evaluation    Patient: Christa Grover   MRN:     [id number] Gender:   female   Age:    50 year old :      1970        Preoperative Diagnosis: Hyperparathyroidism (H) [E21.3]   Procedure(s):  NECK EXPLORATION, RIGHT INFERIOR PARATHYROIDECTOMY   Postop Comments: No value filed.     Anesthesia Type: General          Postop Pain Control: Uneventful            Sign Out: Well controlled pain   PONV: No   Neuro/Psych: Uneventful            Sign Out: Acceptable/Baseline neuro status   Airway/Respiratory: Uneventful            Sign Out: Acceptable/Baseline resp. status   CV/Hemodynamics: Uneventful            Sign Out: Acceptable CV status   Other NRE: NONE   DID A NON-ROUTINE EVENT OCCUR? No         Last Anesthesia Record Vitals:  CRNA VITALS  2020 1012 - 2020 1112      2020             Pulse:  97    SpO2:  98 %    Resp Rate (observed):  (!) 3          Last PACU Vitals:  Vitals Value Taken Time   /82 20 1108   Temp 36.2  C (97.1  F) 20 1108   Pulse 86 20 1108   Resp 8 20 1109   SpO2 96 % 20 1108   Temp src     NIBP     Pulse     SpO2     Resp     Temp     Ht Rate     Temp 2     Vitals shown include unvalidated device data.      Electronically Signed By: Jeffy Amaral MD, 2020, 1:16 PM   Demo good eccentric control during descend

## 2022-05-22 ENCOUNTER — HEALTH MAINTENANCE LETTER (OUTPATIENT)
Age: 52
End: 2022-05-22

## 2022-06-29 ENCOUNTER — TRANSFERRED RECORDS (OUTPATIENT)
Dept: HEALTH INFORMATION MANAGEMENT | Facility: CLINIC | Age: 52
End: 2022-06-29

## 2022-09-24 ENCOUNTER — HEALTH MAINTENANCE LETTER (OUTPATIENT)
Age: 52
End: 2022-09-24

## 2023-01-12 ENCOUNTER — TRANSFERRED RECORDS (OUTPATIENT)
Dept: HEALTH INFORMATION MANAGEMENT | Facility: CLINIC | Age: 53
End: 2023-01-12

## 2023-05-08 ENCOUNTER — HEALTH MAINTENANCE LETTER (OUTPATIENT)
Age: 53
End: 2023-05-08

## 2023-06-04 ENCOUNTER — HEALTH MAINTENANCE LETTER (OUTPATIENT)
Age: 53
End: 2023-06-04

## 2023-08-10 ENCOUNTER — TRANSFERRED RECORDS (OUTPATIENT)
Dept: HEALTH INFORMATION MANAGEMENT | Facility: CLINIC | Age: 53
End: 2023-08-10

## 2024-02-22 ENCOUNTER — TRANSFERRED RECORDS (OUTPATIENT)
Dept: HEALTH INFORMATION MANAGEMENT | Facility: CLINIC | Age: 54
End: 2024-02-22
Payer: COMMERCIAL

## 2024-10-10 ENCOUNTER — TRANSFERRED RECORDS (OUTPATIENT)
Dept: HEALTH INFORMATION MANAGEMENT | Facility: CLINIC | Age: 54
End: 2024-10-10

## (undated) DEVICE — CLIP HORIZON MED BLUE 002200

## (undated) DEVICE — CLIP HORIZON SM RED WIDE SLOT 001201

## (undated) DEVICE — ESU PENCIL SMOKE EVAC W/ROCKER SWITCH 0703-047-000

## (undated) DEVICE — NIM PROBE PRASS INCREMENTING TIP 8225825

## (undated) DEVICE — SUCTION MANIFOLD NEPTUNE 2 SYS 1 PORT 702-025-000

## (undated) DEVICE — TUBE ENDOTRACHEAL NIM TRIVANTAGE 6.0MM 8229706

## (undated) DEVICE — NDL BUTTERFLY 21G .75" 367281

## (undated) DEVICE — ESU ELEC BLADE 2.75" COATED/INSULATED E1455

## (undated) DEVICE — SPECIMEN CONTAINER URINE 90ML STERILE 75.1435.002

## (undated) DEVICE — SU MONOCRYL 5-0 P-3 18" UND Y493G

## (undated) DEVICE — SOL WATER IRRIG 500ML BOTTLE 2F7113

## (undated) DEVICE — SURGICEL FIBRILLAR HEMOSTAT 1"X2" 1961

## (undated) DEVICE — PACK ENT MINOR CUSTOM ASC

## (undated) DEVICE — SUCTION SLEEVE NEPTUNE 2 125MM 0703-005-125

## (undated) DEVICE — GLOVE PROTEXIS W/NEU-THERA 6.5  2D73TE65

## (undated) DEVICE — ADH SKIN CLOSURE PREMIERPRO EXOFIN 1.0ML 3470

## (undated) DEVICE — ESU GROUND PAD ADULT W/CORD E7507

## (undated) DEVICE — SU CHROMIC 3-0 SH 27" G122H

## (undated) DEVICE — SOL NACL 0.9% IRRIG 500ML BOTTLE 2F7123

## (undated) DEVICE — PREP CHLORAPREP W/ORANGE TINT 10.5ML 260715

## (undated) DEVICE — LINEN TOWEL PACK X5 5464

## (undated) RX ORDER — PROPOFOL 10 MG/ML
INJECTION, EMULSION INTRAVENOUS
Status: DISPENSED
Start: 2020-12-08

## (undated) RX ORDER — GLYCOPYRROLATE 0.2 MG/ML
INJECTION INTRAMUSCULAR; INTRAVENOUS
Status: DISPENSED
Start: 2020-12-08

## (undated) RX ORDER — ACETAMINOPHEN 325 MG/1
TABLET ORAL
Status: DISPENSED
Start: 2020-12-08

## (undated) RX ORDER — DEXAMETHASONE SODIUM PHOSPHATE 10 MG/ML
INJECTION, SOLUTION INTRAMUSCULAR; INTRAVENOUS
Status: DISPENSED
Start: 2020-12-08

## (undated) RX ORDER — FENTANYL CITRATE 50 UG/ML
INJECTION, SOLUTION INTRAMUSCULAR; INTRAVENOUS
Status: DISPENSED
Start: 2020-12-08

## (undated) RX ORDER — FENTANYL CITRATE-0.9 % NACL/PF 10 MCG/ML
PLASTIC BAG, INJECTION (ML) INTRAVENOUS
Status: DISPENSED
Start: 2020-12-08

## (undated) RX ORDER — ONDANSETRON 2 MG/ML
INJECTION INTRAMUSCULAR; INTRAVENOUS
Status: DISPENSED
Start: 2020-12-08

## (undated) RX ORDER — EPHEDRINE SULFATE 50 MG/ML
INJECTION, SOLUTION INTRAMUSCULAR; INTRAVENOUS; SUBCUTANEOUS
Status: DISPENSED
Start: 2020-12-08

## (undated) RX ORDER — REMIFENTANIL HYDROCHLORIDE 1 MG/ML
INJECTION, POWDER, LYOPHILIZED, FOR SOLUTION INTRAVENOUS
Status: DISPENSED
Start: 2020-12-08

## (undated) RX ORDER — KETOROLAC TROMETHAMINE 30 MG/ML
INJECTION, SOLUTION INTRAMUSCULAR; INTRAVENOUS
Status: DISPENSED
Start: 2020-12-08

## (undated) RX ORDER — LIDOCAINE HYDROCHLORIDE 20 MG/ML
INJECTION, SOLUTION EPIDURAL; INFILTRATION; INTRACAUDAL; PERINEURAL
Status: DISPENSED
Start: 2020-12-08

## (undated) RX ORDER — OXYCODONE HYDROCHLORIDE 5 MG/1
TABLET ORAL
Status: DISPENSED
Start: 2020-12-08

## (undated) RX ORDER — GABAPENTIN 300 MG/1
CAPSULE ORAL
Status: DISPENSED
Start: 2020-12-08